# Patient Record
Sex: FEMALE | Race: BLACK OR AFRICAN AMERICAN | NOT HISPANIC OR LATINO | URBAN - METROPOLITAN AREA
[De-identification: names, ages, dates, MRNs, and addresses within clinical notes are randomized per-mention and may not be internally consistent; named-entity substitution may affect disease eponyms.]

---

## 2024-09-18 ENCOUNTER — LAB OUTSIDE AN ENCOUNTER (OUTPATIENT)
Dept: URBAN - METROPOLITAN AREA CLINIC 118 | Facility: CLINIC | Age: 59
End: 2024-09-18

## 2024-09-18 ENCOUNTER — OFFICE VISIT (OUTPATIENT)
Dept: URBAN - METROPOLITAN AREA CLINIC 118 | Facility: CLINIC | Age: 59
End: 2024-09-18
Payer: COMMERCIAL

## 2024-09-18 ENCOUNTER — DASHBOARD ENCOUNTERS (OUTPATIENT)
Age: 59
End: 2024-09-18

## 2024-09-18 VITALS
WEIGHT: 167.4 LBS | BODY MASS INDEX: 26.9 KG/M2 | SYSTOLIC BLOOD PRESSURE: 133 MMHG | DIASTOLIC BLOOD PRESSURE: 83 MMHG | HEIGHT: 66 IN | HEART RATE: 73 BPM | TEMPERATURE: 97.3 F

## 2024-09-18 DIAGNOSIS — R10.12 LUQ PAIN: ICD-10-CM

## 2024-09-18 DIAGNOSIS — R10.13 EPIGASTRIC ABDOMINAL PAIN: ICD-10-CM

## 2024-09-18 DIAGNOSIS — Z86.010 PERSONAL HISTORY OF COLON POLYPS: ICD-10-CM

## 2024-09-18 PROCEDURE — 99214 OFFICE O/P EST MOD 30 MIN: CPT | Performed by: INTERNAL MEDICINE

## 2024-09-18 RX ORDER — DICYCLOMINE HYDROCHLORIDE 10 MG/1
1 TABLET CAPSULE ORAL THREE TIMES A DAY
Qty: 90 TABLET | Refills: 1 | OUTPATIENT
Start: 2024-09-18 | End: 2024-11-16

## 2024-09-18 NOTE — HPI-TODAY'S VISIT:
she reports LUQ abd pain she reports tried to get repeat colon since the one in 2015 but had stool so they couldn't complete it, she reports she followed all the instructions was on naprosyn for a while but off now did take pantoprazole for 8 weeks and didn't help at all   colon Dr Simon 2015 One 10 mm polyp at the hepatic flexure. Resected and retrieved. - One 3 mm polyp at the hepatic flexure. Resected and retrieved. - Internal hemorrhoids. - The examination was otherwise normal on direct and retroflexion views.  TA

## 2024-10-01 ENCOUNTER — CLAIMS CREATED FROM THE CLAIM WINDOW (OUTPATIENT)
Dept: URBAN - METROPOLITAN AREA SURGERY CENTER 23 | Facility: SURGERY CENTER | Age: 59
End: 2024-10-01
Payer: COMMERCIAL

## 2024-10-01 DIAGNOSIS — Z86.0100 HISTORY OF COLON POLYPS: ICD-10-CM

## 2024-10-01 DIAGNOSIS — B96.81 HELICOBACTER PYLORI [H. PYLORI] AS THE CAUSE OF DISEASES CLASSIFIED ELSEWHERE: ICD-10-CM

## 2024-10-01 DIAGNOSIS — K29.60 OTHER GASTRITIS WITHOUT BLEEDING: ICD-10-CM

## 2024-10-01 DIAGNOSIS — R10.13 ABDOMINAL DISCOMFORT, EPIGASTRIC: ICD-10-CM

## 2024-10-01 DIAGNOSIS — K29.70 GASTRITIS, UNSPECIFIED, WITHOUT BLEEDING: ICD-10-CM

## 2024-10-01 DIAGNOSIS — Z86.0100 PERSONAL HISTORY OF COLONIC POLYPS: ICD-10-CM

## 2024-10-01 DIAGNOSIS — Z12.11 COLON CANCER SCREENING: ICD-10-CM

## 2024-10-01 PROCEDURE — 43239 EGD BIOPSY SINGLE/MULTIPLE: CPT | Performed by: INTERNAL MEDICINE

## 2024-10-01 PROCEDURE — 0529F INTRVL 3/>YR PTS CLNSCP DOCD: CPT | Performed by: INTERNAL MEDICINE

## 2024-10-01 PROCEDURE — G0105 COLORECTAL SCRN; HI RISK IND: HCPCS | Performed by: INTERNAL MEDICINE

## 2024-10-01 PROCEDURE — 00813 ANES UPR LWR GI NDSC PX: CPT | Performed by: NURSE ANESTHETIST, CERTIFIED REGISTERED

## 2024-10-01 RX ORDER — DICYCLOMINE HYDROCHLORIDE 10 MG/1
1 TABLET CAPSULE ORAL THREE TIMES A DAY
Qty: 90 TABLET | Refills: 1 | Status: ACTIVE | COMMUNITY
Start: 2024-09-18 | End: 2024-11-16

## 2024-10-14 ENCOUNTER — TELEPHONE ENCOUNTER (OUTPATIENT)
Dept: URBAN - METROPOLITAN AREA CLINIC 109 | Facility: CLINIC | Age: 59
End: 2024-10-14

## 2024-10-14 RX ORDER — AMOXICILLIN 500 MG/1
2 CAPSULES CAPSULE ORAL
Qty: 56 CAPSULE | Refills: 0 | OUTPATIENT
Start: 2024-10-15 | End: 2024-10-29

## 2024-10-14 RX ORDER — OMEPRAZOLE 20 MG/1
1 CAPSULE ON AN EMPTY STOMACH CAPSULE, DELAYED RELEASE ORAL TWICE DAILY
Qty: 28 CAPSULE | Refills: 0 | OUTPATIENT
Start: 2024-10-15

## 2024-10-14 RX ORDER — CLARITHROMYCIN 500 MG/1
1 TABLET TABLET, FILM COATED ORAL
Qty: 28 TABLET | Refills: 0 | OUTPATIENT
Start: 2024-10-15 | End: 2024-10-29

## 2024-12-16 ENCOUNTER — LAB OUTSIDE AN ENCOUNTER (OUTPATIENT)
Dept: URBAN - METROPOLITAN AREA CLINIC 109 | Facility: CLINIC | Age: 59
End: 2024-12-16

## 2024-12-18 LAB — H PYLORI BREATH TEST: NOT DETECTED

## 2024-12-20 ENCOUNTER — TELEPHONE ENCOUNTER (OUTPATIENT)
Dept: URBAN - METROPOLITAN AREA CLINIC 118 | Facility: CLINIC | Age: 59
End: 2024-12-20

## 2024-12-20 RX ORDER — DICYCLOMINE HYDROCHLORIDE 20 MG/1
1 TABLET TABLET ORAL
Qty: 180 | Refills: 2 | OUTPATIENT
Start: 2024-12-22 | End: 2025-09-18

## 2025-05-07 ENCOUNTER — LAB OUTSIDE AN ENCOUNTER (OUTPATIENT)
Dept: URBAN - METROPOLITAN AREA CLINIC 118 | Facility: CLINIC | Age: 60
End: 2025-05-07

## 2025-05-07 ENCOUNTER — OFFICE VISIT (OUTPATIENT)
Dept: URBAN - METROPOLITAN AREA CLINIC 118 | Facility: CLINIC | Age: 60
End: 2025-05-07
Payer: COMMERCIAL

## 2025-05-07 DIAGNOSIS — Z86.0101 PERSONAL HISTORY OF ADENOMATOUS AND SERRATED COLON POLYPS: ICD-10-CM

## 2025-05-07 DIAGNOSIS — Z86.19 HISTORY OF HELICOBACTER PYLORI INFECTION: ICD-10-CM

## 2025-05-07 DIAGNOSIS — R10.12 LUQ PAIN: ICD-10-CM

## 2025-05-07 DIAGNOSIS — R07.89 COSTOCHONDRAL CHEST PAIN: ICD-10-CM

## 2025-05-07 PROCEDURE — 99214 OFFICE O/P EST MOD 30 MIN: CPT | Performed by: INTERNAL MEDICINE

## 2025-05-07 RX ORDER — OMEPRAZOLE 20 MG/1
1 CAPSULE ON AN EMPTY STOMACH CAPSULE, DELAYED RELEASE ORAL TWICE DAILY
Qty: 28 CAPSULE | Refills: 0 | Status: ACTIVE | COMMUNITY
Start: 2024-10-15

## 2025-05-07 RX ORDER — OMEPRAZOLE 20 MG/1
1 CAPSULE ON AN EMPTY STOMACH CAPSULE, DELAYED RELEASE ORAL TWICE DAILY
Qty: 28 CAPSULE | Refills: 0 | OUTPATIENT
Start: 2025-05-07

## 2025-05-07 RX ORDER — DICYCLOMINE HYDROCHLORIDE 20 MG/1
1 TABLET TABLET ORAL
Qty: 180 | Refills: 2 | Status: ACTIVE | COMMUNITY
Start: 2024-12-22 | End: 2025-09-18

## 2025-05-07 NOTE — HPI-TODAY'S VISIT:
5/7/25 Still with LUQ pain many months now feels like a growth or pressure under her left chest/breast constantly left lower and and lateral rib hurts so much had to get a different type of bra  no dyspepsia now   Catalina Landeros  12/20/2024 02:16:56 PM EST >Patient is calling because she wanted lab results. Relayed message and she said she still feeling the same. Patient is having pain under her left side ( under breast). No nausea but feels full quickly. Patient wanted to know what else can she do? Please advise, thank you. Sami Perea  12/22/2024 10:01:25 PM EST > I am sorry, that is unexpected, she should have felt better with treating the infection I sent in a higher dose of the dicyclomine for her to take If that doesn't help at all then something else must be going on and I would recommend returning to the office for further evaluation Evangelina Rush  12/24/2024 10:45:29 AM EST > Spoke w/pt, informed of reply from provider. Pt will  Rx and try it for the next few weeks. If no improvement she will call to schedule an appt for further eval.  Normal examined duodenum. EGD/colon 10/2024  Gastritis, characterized by erythema, granularity and nodularity. Biopsied. Normal esophagus.  The examined portion of the ileum was normal. The entire examined colon is normal. No specimens collected. Recommendation: Patient has a contact number available for emergencies. The signs and symptoms of potential delayed complications were discussed with the patient. Return to normal activities tomorrow. Written discharge instructions were provided to the patient. Resume previous diet. Continue present medications. Repeat colonoscopy in 5 years for surveillance. 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  Chronic active gastritis. Helicobacter pylori-like microorganisms are identified with a special stain  9/2024  she reports LUQ abd pain she reports tried to get repeat colon since the one in 2015 but had stool so they couldn't complete it, she reports she followed all the instructions was on naprosyn for a while but off now did take pantoprazole for 8 weeks and didn't help at all   colon Dr Simon 2015 One 10 mm polyp at the hepatic flexure. Resected and retrieved. - One 3 mm polyp at the hepatic flexure. Resected and retrieved. - Internal hemorrhoids. - The examination was otherwise normal on direct and retroflexion views.  TA

## 2025-05-16 ENCOUNTER — LAB OUTSIDE AN ENCOUNTER (OUTPATIENT)
Dept: URBAN - METROPOLITAN AREA CLINIC 109 | Facility: CLINIC | Age: 60
End: 2025-05-16